# Patient Record
Sex: MALE | Race: WHITE | Employment: FULL TIME | URBAN - METROPOLITAN AREA
[De-identification: names, ages, dates, MRNs, and addresses within clinical notes are randomized per-mention and may not be internally consistent; named-entity substitution may affect disease eponyms.]

---

## 2018-09-24 ENCOUNTER — HOSPITAL ENCOUNTER (OUTPATIENT)
Age: 33
Discharge: HOME | End: 2018-09-24
Attending: FAMILY MEDICINE

## 2018-09-24 VITALS
DIASTOLIC BLOOD PRESSURE: 78 MMHG | RESPIRATION RATE: 16 BRPM | SYSTOLIC BLOOD PRESSURE: 124 MMHG | TEMPERATURE: 99 F | OXYGEN SATURATION: 97 % | WEIGHT: 240 LBS | HEART RATE: 120 BPM | HEIGHT: 74 IN | BODY MASS INDEX: 30.8 KG/M2

## 2018-09-24 DIAGNOSIS — S61.216A LACERATION OF RIGHT LITTLE FINGER WITHOUT FOREIGN BODY WITHOUT DAMAGE TO NAIL, INITIAL ENCOUNTER: ICD-10-CM

## 2018-09-24 DIAGNOSIS — S61.411A LACERATION OF RIGHT HAND WITHOUT FOREIGN BODY, INITIAL ENCOUNTER: ICD-10-CM

## 2018-09-24 DIAGNOSIS — S61.511A LACERATION OF RIGHT WRIST, INITIAL ENCOUNTER: Primary | ICD-10-CM

## 2018-09-24 PROCEDURE — 99204 OFFICE O/P NEW MOD 45 MIN: CPT | Performed by: NURSE PRACTITIONER

## 2018-09-24 PROCEDURE — 12004 RPR S/N/AX/GEN/TRK7.6-12.5CM: CPT

## 2018-09-24 PROCEDURE — 90471 IMMUNIZATION ADMIN: CPT

## 2018-09-24 PROCEDURE — 99203 OFFICE O/P NEW LOW 30 MIN: CPT

## 2018-09-24 RX ORDER — CEFDINIR 300 MG/1
300 CAPSULE ORAL 2 TIMES DAILY
Qty: 20 CAPSULE | Refills: 0 | Status: SHIPPED | OUTPATIENT
Start: 2018-09-24 | End: 2018-10-04

## 2018-09-24 NOTE — ED INITIAL ASSESSMENT (HPI)
Pt presents tonight with c/o trip and fall on stairs onto a plate. Pt has lacerations to left forearm and hand. Pt unsure of tetanus status.

## 2018-09-24 NOTE — ED PROVIDER NOTES
Patient Seen in: [facility] In [facility]    History   Patient presents with:  Laceration    Stated Complaint: hand/arm injury     27-year-old male who presents to the immediate care with complaints of multiple lacerations to the right upper SpO2 100 %   O2 Device None (Room air)       Current:/78   Pulse 120   Temp 98.8 °F (37.1 °C) (Temporal)   Resp 16   Ht 188 cm (6' 2\")   Wt 108.9 kg   SpO2 97%   BMI 30.81 kg/m²         Physical Exam   Constitutional: He is oriented to person, place Wounds to the right upper extremity to the hand to the wrist and the left fifth finger. The wound was copiously irrigated with normal saline. The wound was prepped and draped in the normal sterile fashion.  The wound was anesthetized using a total of 10 mL' START taking these medications    cefdinir 300 MG Oral Cap  Take 1 capsule (300 mg total) by mouth 2 (two) times daily for 10 days. , Print, Disp-20 capsule, R-0

## 2018-10-06 ENCOUNTER — HOSPITAL ENCOUNTER (OUTPATIENT)
Age: 33
Discharge: HOME | End: 2018-10-06
Payer: MEDICAID

## 2018-10-06 VITALS
TEMPERATURE: 99 F | SYSTOLIC BLOOD PRESSURE: 139 MMHG | RESPIRATION RATE: 20 BRPM | HEART RATE: 85 BPM | DIASTOLIC BLOOD PRESSURE: 80 MMHG | OXYGEN SATURATION: 97 %

## 2018-10-06 DIAGNOSIS — Z48.02 ENCOUNTER FOR REMOVAL OF SUTURES: Primary | ICD-10-CM

## 2018-10-06 NOTE — ED PROVIDER NOTES
Patient Seen in: [facility]    History   Patient presents with:  Sut Stap RingRemoval (ingtegumentary)    Stated Complaint: F/U STITCH REMOVAL    HPI    Patient is a pleasant 82-year-old male.   2 weeks prior to arrival, patient irritation at suture sites. With removal, there is slight dehiscence but the wound demonstrates  granulating healing changes. All sutures removed. The right lateral fifth digit has 3 sutures. These were removed with no dehiscence.   Neuro: Cranial ner

## 2018-10-06 NOTE — ED INITIAL ASSESSMENT (HPI)
Pt here for removal if stitches from his rt wrist , hand and finger.   They are well approximated, and without any pain or signs of infection

## (undated) NOTE — LETTER
Date & Time: 9/24/2018, 8:07 PM  Patient: Ruben Spencer  Encounter Provider(s):    KIMBERLY Watters       To Whom It May Concern:    Ruben Spencer was seen and treated in our department on 9/24/2018. He should not return to work until 9/26/18.     If yo